# Patient Record
(demographics unavailable — no encounter records)

---

## 2025-07-18 NOTE — HISTORY OF PRESENT ILLNESS
[de-identified] : Devorah Rocha is a 64-year-old female patient injured at work. When the rolling chair popped out from under her, she fell back, hitting her lower mid and upper back. She whiplashed and hit her head on the floor. She is unaware of whether she lost consciousness. She has headaches, dizziness, neck pain, bilateral upper extremity pain, numbness, tingling, pins and needles, and subjective weakness. She has been doing physiotherapy with modest improvement. We reviewed her MRIs. She has multilevel cervical spondylosis as well as disc herniations, contributing to cord compression and neuroforaminal stenosis consistent with her symptoms.

## 2025-07-18 NOTE — ADDENDUM
[FreeTextEntry1] : I, Amanda Sidhu (scribe) assisted in filling out this chart under the dictation of Anival Cuello on 07/17/2025.

## 2025-07-18 NOTE — END OF VISIT
[FreeTextEntry3] : Documented by Amanda Sidhu acting as a scribe for Anival Cuello on 07/17/2025.   All medical record entries made by the Scribe were at my, Dr. Anival Cuello, direction and personally dictated by me on 07/17/2025. I have reviewed the chart and agree that the record accurately reflects my personal performance of the history, physical exam, assessment, and plan. I have also personally directed, reviewed, and agreed with the chart.

## 2025-07-18 NOTE — PLAN
[TextEntry] : She has been out of work. She is at this juncture 100% temporarily disabled from any form of gainful employment. I have encouraged her to participate in physiotherapy, initiate Diclofenac, and follow up in 6 weeks' time. If her symptoms are recalcitrant, we may consider the addition of home cervical traction, epidural steroid injections, acupuncture, chiropractic, and others. At this point, she is not anxious to pursue surgical intervention, as this would entail multilevel anterior cervical discectomy and fusion at C4 to C7. She was prescribed Diclofenac, given a new prescription for physiotherapy.   In terms of her thoracic and lumbar spines, I have discouraged her from aggressive treatment in these areas, including but not limited to surgical intervention. She verbalized understanding and wishes to proceed with the aforementioned conservative treatment plan.

## 2025-07-18 NOTE — HISTORY OF PRESENT ILLNESS
[de-identified] : Devorah Rocha is a 64-year-old female patient injured at work. When the rolling chair popped out from under her, she fell back, hitting her lower mid and upper back. She whiplashed and hit her head on the floor. She is unaware of whether she lost consciousness. She has headaches, dizziness, neck pain, bilateral upper extremity pain, numbness, tingling, pins and needles, and subjective weakness. She has been doing physiotherapy with modest improvement. We reviewed her MRIs. She has multilevel cervical spondylosis as well as disc herniations, contributing to cord compression and neuroforaminal stenosis consistent with her symptoms.